# Patient Record
Sex: FEMALE | Race: BLACK OR AFRICAN AMERICAN | NOT HISPANIC OR LATINO | URBAN - METROPOLITAN AREA
[De-identification: names, ages, dates, MRNs, and addresses within clinical notes are randomized per-mention and may not be internally consistent; named-entity substitution may affect disease eponyms.]

---

## 2024-11-12 ENCOUNTER — OFFICE VISIT (OUTPATIENT)
Dept: URGENT CARE | Age: 29
End: 2024-11-12
Payer: COMMERCIAL

## 2024-11-12 VITALS
OXYGEN SATURATION: 95 % | DIASTOLIC BLOOD PRESSURE: 93 MMHG | HEART RATE: 115 BPM | BODY MASS INDEX: 29.27 KG/M2 | WEIGHT: 155 LBS | TEMPERATURE: 98.6 F | RESPIRATION RATE: 18 BRPM | HEIGHT: 61 IN | SYSTOLIC BLOOD PRESSURE: 138 MMHG

## 2024-11-12 DIAGNOSIS — J35.8 TONSILLOLITH: Primary | ICD-10-CM

## 2024-11-12 DIAGNOSIS — J02.9 SORE THROAT: ICD-10-CM

## 2024-11-12 LAB — POC RAPID STREP: NEGATIVE

## 2024-11-12 RX ORDER — FLUCONAZOLE 150 MG/1
150 TABLET ORAL ONCE
Qty: 1 TABLET | Refills: 0 | Status: SHIPPED | OUTPATIENT
Start: 2024-11-12 | End: 2024-11-12

## 2024-11-12 RX ORDER — AMOXICILLIN AND CLAVULANATE POTASSIUM 875; 125 MG/1; MG/1
875 TABLET, FILM COATED ORAL 2 TIMES DAILY
Qty: 14 TABLET | Refills: 0 | Status: SHIPPED | OUTPATIENT
Start: 2024-11-12 | End: 2024-11-19

## 2024-11-12 ASSESSMENT — PATIENT HEALTH QUESTIONNAIRE - PHQ9
1. LITTLE INTEREST OR PLEASURE IN DOING THINGS: NOT AT ALL
SUM OF ALL RESPONSES TO PHQ9 QUESTIONS 1 AND 2: 0
2. FEELING DOWN, DEPRESSED OR HOPELESS: NOT AT ALL

## 2024-11-12 NOTE — LETTER
November 12, 2024     Patient: Amber Burger   YOB: 1995   Date of Visit: 11/12/2024       To Whom It May Concern:    Amber Burger was seen in my clinic on 11/12/2024 at 11:30 am. Please excuse Amber for her absence from work on this day to make the appointment. May return to work 11/14/2024    If you have any questions or concerns, please don't hesitate to call.         Sincerely, [facility]        CC: No Recipients

## 2024-11-12 NOTE — PATIENT INSTRUCTIONS
Go to the emergency department for severe symptoms.    Follow-up with your thyroid specialist to manage your condition as discussed.      I recommend getting a COVID-19 home test to be done within 5 days of symptom onset, which is today.    Oral hygiene twice a day as before.  Change her toothbrush after 24 hours of oral antibiotics.    Please take over-the-counter probiotic, any brand, any strength, daily to prevent yeast infection for at least a month.  Wait 2 hours from taking your antibiotic before taking a probiotic.    Acetaminophen 500 mg (Extra StrengthTylenol), 2 tablets every 6 hours as needed for fever or pain.

## 2024-11-12 NOTE — PROGRESS NOTES
Subjective   Patient ID: Amber Burger is a 29 y.o. female. They present today with a chief complaint of Sore Throat (Since yestarday) and Fever.    History of Present Illness  HPI    As noted above.  Symptoms started with cold symptoms and some coughing 5 days ago.  Started having sore throat since the past 2 days and it has progressively gotten worse on the right side.  Patient states she has history of tonsillitis.  Fever up to 101 °F yesterday.  Current pain 8/10.    Patient states she noticed white spots on the right side and enlarged lymph nodes on the right side only.  No COVID test done.    History of goiter, currently not on medications.    Past Medical History  Allergies as of 11/12/2024 - Reviewed 11/12/2024   Allergen Reaction Noted    Mirtazapine Other 11/12/2024       (Not in a hospital admission)       Past Medical History:   Diagnosis Date    Encounter for insertion of intrauterine contraceptive device 06/06/2019    Encounter for insertion of mirena IUD    Encounter for routine postpartum follow-up     Postpartum examination following vaginal delivery    Endocrine, nutritional and metabolic diseases complicating pregnancy, unspecified trimester ([facility]) 03/28/2019    Maternal thyroid dysfunction, antepartum    Finding of abnormal level of heavy metals in blood     Finding of abnormal level of heavy metals in blood    Other specified noninflammatory disorders of vagina 09/16/2021    Vaginal discharge    Personal history of diseases of the blood and blood-forming organs and certain disorders involving the immune mechanism     History of sickle cell trait    Personal history of other diseases of the digestive system 09/03/2013    History of gastroesophageal reflux (GERD)    Personal history of other infectious and parasitic diseases     History of varicella    Personal history of other specified conditions 08/13/2018    History of diarrhea    Vomiting of pregnancy, unspecified 12/20/2018    Nausea  "and vomiting in pregnancy    Vomiting of pregnancy, unspecified 12/20/2018    Nausea and vomiting during pregnancy       Past Surgical History:   Procedure Laterality Date    OTHER SURGICAL HISTORY  04/22/2019    Dilation and curettage        reports that she has never smoked. She has never used smokeless tobacco. She reports current alcohol use.    Review of Systems  Review of Systems                               Objective    Vitals:    11/12/24 1209   BP: (!) 138/93   BP Location: Left arm   Patient Position: Sitting   BP Cuff Size: Adult   Pulse: (!) 115   Resp: 18   Temp: 37 °C (98.6 °F)   TempSrc: Oral   SpO2: 95%   Weight: 70.3 kg (155 lb)   Height: 1.549 m (5' 1\")     No LMP recorded (approximate). (Menstrual status: IUD).    Physical Exam    Constitutional: Vital signs reviewed. Well developed and well nourished. Patient alert and without distress.     Head and Face: Normal, no swelling.     Eyes: Pupils and irises normal. Pink conjunctivae, anicteric sclerae. No conjunctival injection.  Positive for proptosis.    Ears, Nose, Mouth and Throat: External inspection of ears: Normal. Otoscopic exam: Normal. Nasal Mucosa, septum and turbinates: +mildly swollen turbinates. Oropharynx: 3+ Red tonsil on the right with tonsillolith.  No bulging pillars, no drooling or trismus, no uvular deviation.  +postnasal drainage.     Neck: Enlarged, firm thyroid without tenderness. Supple.    Cardiovascular: Heart rate mild tachycardia, normal S1 and S2, no gallops, no murmurs and no pericardial rub. Rhythm: Normal. No peripheral edema.    Pulmonary: No respiratory distress. Clear breath sounds.    Neurologic: Cortical function: Normal    Lymphatic: + cervical lymphadenopathy      Procedures    Point of Care Test & Imaging Results from this visit  Results for orders placed or performed in visit on 11/12/24   POCT rapid strep A manually resulted   Result Value Ref Range    POC Rapid Strep Negative Negative      No results " found.    Diagnostic study results (if any) were reviewed by Blaire Downing.    Assessment/Plan   Allergies, medications, history, and pertinent labs/EKGs/Imaging reviewed by Blaire Downing.     Medical Decision Making      Orders and Diagnoses  Diagnoses and all orders for this visit:  Tonsillolith  -     amoxicillin-pot clavulanate (Augmentin) 875-125 mg tablet; Take 1 tablet (875 mg) by mouth 2 times a day for 7 days.  -     fluconazole (Diflucan) 150 mg tablet; Take 1 tablet (150 mg) by mouth 1 time for 1 dose.  Sore throat  -     POCT rapid strep A manually resulted  -     amoxicillin-pot clavulanate (Augmentin) 875-125 mg tablet; Take 1 tablet (875 mg) by mouth 2 times a day for 7 days.  -     fluconazole (Diflucan) 150 mg tablet; Take 1 tablet (150 mg) by mouth 1 time for 1 dose.      Medical Admin Record      Patient disposition: Home    Electronically signed by Blaire Downing  3:11 PM

## 2025-08-12 ENCOUNTER — APPOINTMENT (OUTPATIENT)
Dept: ENDOCRINOLOGY | Facility: CLINIC | Age: 30
End: 2025-08-12
Payer: COMMERCIAL

## 2025-08-12 VITALS
BODY MASS INDEX: 30.17 KG/M2 | SYSTOLIC BLOOD PRESSURE: 104 MMHG | WEIGHT: 159.8 LBS | HEART RATE: 90 BPM | HEIGHT: 61 IN | DIASTOLIC BLOOD PRESSURE: 70 MMHG | RESPIRATION RATE: 16 BRPM

## 2025-08-12 DIAGNOSIS — E05.00 GRAVES DISEASE: Primary | ICD-10-CM

## 2025-08-12 PROBLEM — E55.9 VITAMIN D DEFICIENCY: Status: ACTIVE | Noted: 2025-08-12

## 2025-08-12 PROCEDURE — 99214 OFFICE O/P EST MOD 30 MIN: CPT | Performed by: INTERNAL MEDICINE

## 2025-08-12 PROCEDURE — 1036F TOBACCO NON-USER: CPT | Performed by: INTERNAL MEDICINE

## 2025-08-12 PROCEDURE — 3008F BODY MASS INDEX DOCD: CPT | Performed by: INTERNAL MEDICINE

## 2025-08-12 PROCEDURE — 99204 OFFICE O/P NEW MOD 45 MIN: CPT | Performed by: INTERNAL MEDICINE

## 2025-08-12 RX ORDER — METHIMAZOLE 10 MG/1
10 TABLET ORAL 2 TIMES DAILY
Qty: 180 TABLET | Refills: 1 | Status: SHIPPED | OUTPATIENT
Start: 2025-08-12 | End: 2026-08-12

## 2025-08-12 RX ORDER — ATENOLOL 25 MG/1
25 TABLET ORAL DAILY
Qty: 90 TABLET | Refills: 1 | Status: SHIPPED | OUTPATIENT
Start: 2025-08-12 | End: 2026-08-12

## 2025-08-12 ASSESSMENT — ENCOUNTER SYMPTOMS
DIARRHEA: 0
FATIGUE: 0
VOMITING: 0
FEVER: 0
PALPITATIONS: 0
OCCASIONAL FEELINGS OF UNSTEADINESS: 0
CHILLS: 0
NAUSEA: 0
LOSS OF SENSATION IN FEET: 0
COUGH: 0
SHORTNESS OF BREATH: 0
HEADACHES: 0
DEPRESSION: 0

## 2025-08-12 ASSESSMENT — PAIN SCALES - GENERAL: PAINLEVEL_OUTOF10: 0-NO PAIN
